# Patient Record
Sex: FEMALE | URBAN - METROPOLITAN AREA
[De-identification: names, ages, dates, MRNs, and addresses within clinical notes are randomized per-mention and may not be internally consistent; named-entity substitution may affect disease eponyms.]

---

## 2017-10-25 ENCOUNTER — IMPORTED ENCOUNTER (OUTPATIENT)
Dept: URBAN - METROPOLITAN AREA CLINIC 1 | Facility: CLINIC | Age: 42
End: 2017-10-25

## 2017-10-25 PROBLEM — H52.13: Noted: 2017-10-25

## 2017-10-25 PROCEDURE — 92004 COMPRE OPH EXAM NEW PT 1/>: CPT

## 2017-10-25 PROCEDURE — 92015 DETERMINE REFRACTIVE STATE: CPT

## 2017-10-25 NOTE — PATIENT DISCUSSION
1. Myopia: Rx was given for correction if indicated and requested. 2. Hxqf-LC-xidmzem 3. OZL-TP-MPK patient to start using AT BID OU 4. Return for an appointment in 1 year for 40 and Contact lens check. with Dr. Debbie Keen.

## 2019-10-28 ENCOUNTER — IMPORTED ENCOUNTER (OUTPATIENT)
Dept: URBAN - METROPOLITAN AREA CLINIC 1 | Facility: CLINIC | Age: 44
End: 2019-10-28

## 2019-10-28 PROBLEM — H52.13: Noted: 2019-10-28

## 2019-10-28 PROCEDURE — 92014 COMPRE OPH EXAM EST PT 1/>: CPT

## 2019-10-28 PROCEDURE — 92015 DETERMINE REFRACTIVE STATE: CPT

## 2019-10-28 NOTE — PATIENT DISCUSSION
1. Myopia: Rx was given for correction if indicated and requested. 2. Dry Eyes OU - ATs BID OU 3. Pinguecula OD - Observe 4. Iris Nevus OS - Observe CTL changes made and finalized Return for an appointment in 1 year 40/[facility] with Dr. Jerald Trejo.

## 2022-04-02 ASSESSMENT — KERATOMETRY
OS_K1POWER_DIOPTERS: 43.00
OD_K1POWER_DIOPTERS: 43.25
OS_K2POWER_DIOPTERS: 43.25
OD_AXISANGLE2_DEGREES: 113
OS_AXISANGLE2_DEGREES: 084
OD_K2POWER_DIOPTERS: 43.25

## 2022-04-02 ASSESSMENT — VISUAL ACUITY
OS_SC: 20/20-1
OD_SC: 20/20
OS_SC: 20/20
OD_CC: J1+
OD_CC: J1+
OD_SC: 20/25
OS_CC: J1+
OS_CC: J1+

## 2022-04-02 ASSESSMENT — TONOMETRY
OS_IOP_MMHG: 14
OS_IOP_MMHG: 14
OD_IOP_MMHG: 15
OD_IOP_MMHG: 14